# Patient Record
Sex: FEMALE | Race: WHITE
[De-identification: names, ages, dates, MRNs, and addresses within clinical notes are randomized per-mention and may not be internally consistent; named-entity substitution may affect disease eponyms.]

---

## 2022-12-20 ENCOUNTER — HOSPITAL ENCOUNTER (OUTPATIENT)
Dept: HOSPITAL 75 - RAD FS | Age: 27
End: 2022-12-20
Payer: MEDICAID

## 2022-12-20 DIAGNOSIS — M79.672: Primary | ICD-10-CM

## 2022-12-20 PROCEDURE — 73630 X-RAY EXAM OF FOOT: CPT

## 2022-12-20 NOTE — DIAGNOSTIC IMAGING REPORT
INDICATION:  LEFT FOOT PAIN



COMPARISON: None.



FINDINGS: 3 views of the left foot demonstrate no acute fracture

or dislocation. There are no focal osseous lesions. There is no

soft tissue swelling. Joint spaces are well maintained.  No

radiopaque foreign bodies are seen.



IMPRESSION: No acute fractures or dislocations of the left foot.



Dictated by: 



  Dictated on workstation # [id number]